# Patient Record
Sex: FEMALE | Race: OTHER | HISPANIC OR LATINO | Employment: UNEMPLOYED | URBAN - METROPOLITAN AREA
[De-identification: names, ages, dates, MRNs, and addresses within clinical notes are randomized per-mention and may not be internally consistent; named-entity substitution may affect disease eponyms.]

---

## 2022-09-08 ENCOUNTER — HOSPITAL ENCOUNTER (EMERGENCY)
Facility: HOSPITAL | Age: 32
Discharge: HOME | End: 2022-09-08
Attending: EMERGENCY MEDICINE
Payer: COMMERCIAL

## 2022-09-08 ENCOUNTER — APPOINTMENT (EMERGENCY)
Dept: CT IMAGING | Facility: HOSPITAL | Age: 32
End: 2022-09-08
Payer: COMMERCIAL

## 2022-09-08 ENCOUNTER — APPOINTMENT (OUTPATIENT)
Dept: ULTRASOUND IMAGING | Facility: HOSPITAL | Age: 32
End: 2022-09-08
Payer: COMMERCIAL

## 2022-09-08 VITALS
SYSTOLIC BLOOD PRESSURE: 143 MMHG | WEIGHT: 162.7 LBS | TEMPERATURE: 97.9 F | HEART RATE: 85 BPM | OXYGEN SATURATION: 100 % | RESPIRATION RATE: 16 BRPM | DIASTOLIC BLOOD PRESSURE: 97 MMHG

## 2022-09-08 DIAGNOSIS — R11.2 NAUSEA AND VOMITING: ICD-10-CM

## 2022-09-08 DIAGNOSIS — R10.11 RIGHT UPPER QUADRANT ABDOMINAL PAIN: Primary | ICD-10-CM

## 2022-09-08 DIAGNOSIS — R79.89 ELEVATED LFTS: ICD-10-CM

## 2022-09-08 LAB
ALBUMIN SERPL BCP-MCNC: 3.9 G/DL (ref 3.5–5)
ALP SERPL-CCNC: 104 U/L (ref 46–116)
ALT SERPL W P-5'-P-CCNC: 101 U/L (ref 12–78)
ANION GAP SERPL CALCULATED.3IONS-SCNC: 6 MMOL/L (ref 4–13)
AST SERPL W P-5'-P-CCNC: 250 U/L (ref 5–45)
BASOPHILS # BLD AUTO: 0.02 THOUSANDS/ΜL (ref 0–0.1)
BASOPHILS NFR BLD AUTO: 0 % (ref 0–1)
BILIRUB SERPL-MCNC: 0.58 MG/DL (ref 0.2–1)
BILIRUB UR QL STRIP: NEGATIVE
BUN SERPL-MCNC: 14 MG/DL (ref 5–25)
CALCIUM SERPL-MCNC: 9 MG/DL (ref 8.3–10.1)
CHLORIDE SERPL-SCNC: 103 MMOL/L (ref 96–108)
CLARITY UR: CLEAR
CO2 SERPL-SCNC: 31 MMOL/L (ref 21–32)
COLOR UR: YELLOW
CREAT SERPL-MCNC: 0.81 MG/DL (ref 0.6–1.3)
EOSINOPHIL # BLD AUTO: 0.02 THOUSAND/ΜL (ref 0–0.61)
EOSINOPHIL NFR BLD AUTO: 0 % (ref 0–6)
ERYTHROCYTE [DISTWIDTH] IN BLOOD BY AUTOMATED COUNT: 11.6 % (ref 11.6–15.1)
EXT PREG TEST URINE: NEGATIVE
EXT. CONTROL ED NAV: NORMAL
GFR SERPL CREATININE-BSD FRML MDRD: 97 ML/MIN/1.73SQ M
GLUCOSE SERPL-MCNC: 129 MG/DL (ref 65–140)
GLUCOSE UR STRIP-MCNC: NEGATIVE MG/DL
HCT VFR BLD AUTO: 41.9 % (ref 34.8–46.1)
HGB BLD-MCNC: 14.1 G/DL (ref 11.5–15.4)
HGB UR QL STRIP.AUTO: NEGATIVE
IMM GRANULOCYTES # BLD AUTO: 0.02 THOUSAND/UL (ref 0–0.2)
IMM GRANULOCYTES NFR BLD AUTO: 0 % (ref 0–2)
KETONES UR STRIP-MCNC: NEGATIVE MG/DL
LEUKOCYTE ESTERASE UR QL STRIP: NEGATIVE
LIPASE SERPL-CCNC: 122 U/L (ref 73–393)
LYMPHOCYTES # BLD AUTO: 0.92 THOUSANDS/ΜL (ref 0.6–4.47)
LYMPHOCYTES NFR BLD AUTO: 15 % (ref 14–44)
MCH RBC QN AUTO: 29.7 PG (ref 26.8–34.3)
MCHC RBC AUTO-ENTMCNC: 33.7 G/DL (ref 31.4–37.4)
MCV RBC AUTO: 88 FL (ref 82–98)
MONOCYTES # BLD AUTO: 0.23 THOUSAND/ΜL (ref 0.17–1.22)
MONOCYTES NFR BLD AUTO: 4 % (ref 4–12)
NEUTROPHILS # BLD AUTO: 4.92 THOUSANDS/ΜL (ref 1.85–7.62)
NEUTS SEG NFR BLD AUTO: 81 % (ref 43–75)
NITRITE UR QL STRIP: NEGATIVE
NRBC BLD AUTO-RTO: 0 /100 WBCS
PH UR STRIP.AUTO: 7 [PH] (ref 4.5–8)
PLATELET # BLD AUTO: 265 THOUSANDS/UL (ref 149–390)
PMV BLD AUTO: 9.7 FL (ref 8.9–12.7)
POTASSIUM SERPL-SCNC: 4 MMOL/L (ref 3.5–5.3)
PROT SERPL-MCNC: 8.6 G/DL (ref 6.4–8.4)
PROT UR STRIP-MCNC: NEGATIVE MG/DL
RBC # BLD AUTO: 4.75 MILLION/UL (ref 3.81–5.12)
SODIUM SERPL-SCNC: 140 MMOL/L (ref 135–147)
SP GR UR STRIP.AUTO: 1.02 (ref 1–1.03)
UROBILINOGEN UR QL STRIP.AUTO: 1 E.U./DL
WBC # BLD AUTO: 6.13 THOUSAND/UL (ref 4.31–10.16)

## 2022-09-08 PROCEDURE — 99243 OFF/OP CNSLTJ NEW/EST LOW 30: CPT | Performed by: PHYSICIAN ASSISTANT

## 2022-09-08 PROCEDURE — 76705 ECHO EXAM OF ABDOMEN: CPT

## 2022-09-08 PROCEDURE — 83690 ASSAY OF LIPASE: CPT | Performed by: PHYSICIAN ASSISTANT

## 2022-09-08 PROCEDURE — 96375 TX/PRO/DX INJ NEW DRUG ADDON: CPT

## 2022-09-08 PROCEDURE — G1004 CDSM NDSC: HCPCS

## 2022-09-08 PROCEDURE — 74177 CT ABD & PELVIS W/CONTRAST: CPT

## 2022-09-08 PROCEDURE — 85025 COMPLETE CBC W/AUTO DIFF WBC: CPT | Performed by: PHYSICIAN ASSISTANT

## 2022-09-08 PROCEDURE — NC001 PR NO CHARGE: Performed by: PHYSICIAN ASSISTANT

## 2022-09-08 PROCEDURE — 81025 URINE PREGNANCY TEST: CPT | Performed by: PHYSICIAN ASSISTANT

## 2022-09-08 PROCEDURE — 99284 EMERGENCY DEPT VISIT MOD MDM: CPT | Performed by: PHYSICIAN ASSISTANT

## 2022-09-08 PROCEDURE — 99284 EMERGENCY DEPT VISIT MOD MDM: CPT

## 2022-09-08 PROCEDURE — 80053 COMPREHEN METABOLIC PANEL: CPT | Performed by: PHYSICIAN ASSISTANT

## 2022-09-08 PROCEDURE — 36415 COLL VENOUS BLD VENIPUNCTURE: CPT | Performed by: PHYSICIAN ASSISTANT

## 2022-09-08 PROCEDURE — 96374 THER/PROPH/DIAG INJ IV PUSH: CPT

## 2022-09-08 PROCEDURE — 81003 URINALYSIS AUTO W/O SCOPE: CPT

## 2022-09-08 RX ORDER — ONDANSETRON 2 MG/ML
4 INJECTION INTRAMUSCULAR; INTRAVENOUS ONCE
Status: COMPLETED | OUTPATIENT
Start: 2022-09-08 | End: 2022-09-08

## 2022-09-08 RX ORDER — ONDANSETRON 4 MG/1
4 TABLET, ORALLY DISINTEGRATING ORAL EVERY 6 HOURS PRN
Qty: 20 TABLET | Refills: 0 | Status: SHIPPED | OUTPATIENT
Start: 2022-09-08

## 2022-09-08 RX ORDER — KETOROLAC TROMETHAMINE 30 MG/ML
15 INJECTION, SOLUTION INTRAMUSCULAR; INTRAVENOUS ONCE
Status: COMPLETED | OUTPATIENT
Start: 2022-09-08 | End: 2022-09-08

## 2022-09-08 RX ADMIN — IOHEXOL 100 ML: 350 INJECTION, SOLUTION INTRAVENOUS at 03:08

## 2022-09-08 RX ADMIN — KETOROLAC TROMETHAMINE 15 MG: 30 INJECTION, SOLUTION INTRAMUSCULAR at 02:31

## 2022-09-08 RX ADMIN — ONDANSETRON 4 MG: 2 INJECTION INTRAMUSCULAR; INTRAVENOUS at 02:20

## 2022-09-08 NOTE — CONSULTS
Consultation - General Surgery  Aguilar Mercado 28 y o  female MRN: [id number]  Unit/Bed#: ED 09 Encounter: [id number]    Reason for Consult: abnormal RUQ ultrasound      Assessment/Plan:  81-year-old female with no significant medical comorbidities presents with RUQ pain and symptomatic cholelithiasis    Afebrile, VSS  No leukocytosis with mild left shift  Total bilirubin WNL, mild LFT elevation    CT findings equivocal for acute cholecystitis  RUQ ultrasound revealed gallbladder normal in caliber, calculi without sludge, a focal gallbladder wall thickening and without any biliary dilatation or choledocholithiasis  No pericholecystic fluid indicate acute cholecystitis, with focal thickening may represent adenomyomatosis of the gallbladder  On exam, abdomen is nondistended, normoactive bowel sounds x4, soft and nontender on palpation, no guarding rigidity or peritoneal signs  Plan for PO challenge and discharge to home with ODT zofran with 1 week follow up with Dr Hilda Jerome  If fails PO challenge, will admit to surgery for lap librado    HPI:    Aguilar Mercado is a 28 y o  female with no significant medical comorbidities nor surgical history who presents with RUQ pain and symptomatic cholelithiasis  Pain associated with multiple episodes of nonbloody nonbilious emesis  It started yesterday evening after dinner, pains localized to RUQ and epigastrium with some radiation to the back  One episode of loose nonbloody stools  Denies fever, chills, chest pain, shortness of breath  Had a prior episode of the same about 1 year ago  Patient reported that her mother had gallbladder issues which necessitated lap librado  No other associated symptoms      Review of Systems:  History obtained from the patient  General ROS: negative for - chills or fever  ENT ROS: negative for - hearing change or visual changes  Hematological and Lymphatic ROS: negative for - bleeding problems or bruising  Respiratory ROS: negative for - cough, shortness of breath or wheezing  Cardiovascular ROS: negative for - chest pain or palpitations  Gastrointestinal ROS:  As above  Genito-Urinary ROS: negative for - dysuria or hematuria  Musculoskeletal ROS: negative for - muscle pain or muscular weakness  Dermatological ROS: negative for rash and skin lesion changes   All other ROS negative    Historical Information   History reviewed  No pertinent past medical history  History reviewed  No pertinent surgical history  Social History   Social History     Substance and Sexual Activity   Alcohol Use Yes    Comment: social     Social History     Substance and Sexual Activity   Drug Use Not Currently     Social History     Tobacco Use   Smoking Status Never Smoker   Smokeless Tobacco Never Used     History reviewed  No pertinent family history  Medications:  No current facility-administered medications for this encounter         No Known Allergies    Physical Examination:  Vitals:   Vitals:    09/08/22 0129 09/08/22 0333 09/08/22 0618 09/08/22 0906   BP: (!) 179/98 141/76 120/69 143/97   BP Location: Right arm Right arm Right arm    Pulse: 99 100 (!) 109 85   Resp: 20 19 17 16   Temp: 97 9 °F (36 6 °C)      SpO2: 99% 100% 99% 100%   Weight: 73 8 kg (162 lb 11 2 oz)        Temp  Min: 97 9 °F (36 6 °C)  Max: 97 9 °F (36 6 °C)         /97   Pulse 85   Temp 97 9 °F (36 6 °C)   Resp 16   Wt 73 8 kg (162 lb 11 2 oz)   SpO2 100%   General appearance: alert and oriented, in no acute distress  Head: Normocephalic, without obvious abnormality, atraumatic  Eyes: Sclerae anicteric  Neck: supple, symmetrical, trachea midline  Lungs: clear to auscultation bilaterally  Heart: regular rate and rhythm, S1, S2 normal, no murmur, click, rub or gallop  Abdomen: As above  Extremities: extremities normal, warm and well-perfused; no cyanosis, clubbing, or edema  Skin: Skin color, texture, turgor normal  No rashes or lesions  Neurologic: Grossly normal    Diagnostic Data:  Lab:   I have personally reviewed pertinent lab results  , CBC:   Lab Results   Component Value Date    WBC 6 13 09/08/2022    HGB 14 1 09/08/2022    HCT 41 9 09/08/2022    MCV 88 09/08/2022     09/08/2022    MCH 29 7 09/08/2022    MCHC 33 7 09/08/2022    RDW 11 6 09/08/2022    MPV 9 7 09/08/2022    NRBC 0 09/08/2022   , CMP:   Lab Results   Component Value Date    SODIUM 140 09/08/2022    K 4 0 09/08/2022     09/08/2022    CO2 31 09/08/2022    BUN 14 09/08/2022    CREATININE 0 81 09/08/2022    CALCIUM 9 0 09/08/2022     (H) 09/08/2022     (H) 09/08/2022    ALKPHOS 104 09/08/2022    EGFR 97 09/08/2022     Results from last 7 days   Lab Units 09/08/22  0219   WBC Thousand/uL 6 13   HEMOGLOBIN g/dL 14 1   HEMATOCRIT % 41 9   PLATELETS Thousands/uL 265     Results from last 7 days   Lab Units 09/08/22  0219   POTASSIUM mmol/L 4 0   CHLORIDE mmol/L 103   CO2 mmol/L 31   BUN mg/dL 14   CREATININE mg/dL 0 81   CALCIUM mg/dL 9 0   ALK PHOS U/L 104   ALT U/L 101*   AST U/L 250*       Imaging: I have personally reviewed the pertinent imaging studies on the PACS system  Procedure: US right upper quadrant    Result Date: 9/8/2022  Narrative: RIGHT UPPER QUADRANT ULTRASOUND INDICATION:     ? Faby on CT with CBD involvement  COMPARISON:  CT scan from 9/8/2022 TECHNIQUE:   Real-time ultrasound of the right upper quadrant was performed with a curvilinear transducer with both volumetric sweeps and still imaging techniques  FINDINGS: PANCREAS:  Visualized portions of the pancreas are within normal limits  AORTA AND IVC:  Visualized portions are normal for patient age  LIVER: Size:  Within normal range  The liver measures 16 6 cm in the midclavicular line  Contour:  Surface contour is smooth  Parenchyma:  Echogenicity and echotexture are within normal limits  No liver mass identified   Limited imaging of the main portal vein shows it to be patent and hepatopetal   BILIARY: The gallbladder is normal in caliber  There is focal gallbladder wall thickening  There are multiple dependent calculi without sludge  No sonographic Saez sign  No intrahepatic biliary dilatation  CBD measures 4 0 mm  No choledocholithiasis  KIDNEY: Right kidney measures 10 0 x 4 8 x 5 3 cm  Volume 134 5 mL Kidney within normal limits  ASCITES:   None  Impression: Cholelithiasis with focal gallbladder wall thickening and negative sonographic Saez's sign  The focal gallbladder wall thickening is nonspecific but may represent adenomyomatosis of the gallbladder  Neoplastic entities not excluded  The study was marked in [facility] for immediate notification  Workstation performed: [id number]     Procedure: CT abdomen pelvis with contrast    Result Date: 9/8/2022  Narrative: CT ABDOMEN AND PELVIS WITH IV CONTRAST INDICATION:   RUQ abdominal pain Abdominal pain, acute, nonlocalized Right upper quadrant abdominal pain    COMPARISON:  None  TECHNIQUE:  CT examination of the abdomen and pelvis was performed  Axial, sagittal, and coronal 2D reformatted images were created from the source data and submitted for interpretation  Radiation dose length product (DLP) for this visit:  411 mGy-cm   This examination, like all CT scans performed in the [facility], was performed utilizing techniques to minimize radiation dose exposure, including the use of iterative reconstruction and automated exposure control  IV Contrast:  100 mL of iohexol (OMNIPAQUE) Enteric Contrast:  Enteric contrast was not administered  FINDINGS: ABDOMEN LOWER CHEST:  No clinically significant abnormality identified in the visualized lower chest  LIVER/BILIARY TREE:  Mild prominence of the biliary tree, otherwise grossly unremarkable GALLBLADDER:  Mildly distended gallbladder  Gallbladder wall thickening, consider gallbladder inflammation/cholecystitis in the appropriate clinical setting  SPLEEN:  Unremarkable  PANCREAS:  Unremarkable  ADRENAL GLANDS:  Unremarkable  KIDNEYS/URETERS:  Unremarkable  No hydronephrosis  STOMACH AND BOWEL:  Unremarkable  APPENDIX:  No findings to suggest appendicitis  ABDOMINOPELVIC CAVITY:  No ascites  No pneumoperitoneum  No lymphadenopathy  VESSELS:  Unremarkable for patient's age  PELVIS REPRODUCTIVE ORGANS:  IUD in place, appears appropriately positioned  URINARY BLADDER:  Partially distended bladder  Mild circumferential bladder wall thickening noted, probably exaggerated by underdistention  Consider a cystitis in the appropriate clinical setting  ABDOMINAL WALL/INGUINAL REGIONS:  Unremarkable  OSSEOUS STRUCTURES:  No acute fracture or destructive osseous lesion  Impression: Mildly distended gallbladder with gallbladder wall thickening, consider gallbladder inflammation/cholecystitis in the appropriate clinical setting  Mild prominence of the biliary tree, nonspecific  A component of biliary obstruction may be present  Right upper quadrant ultrasound may be of benefit for further evaluation  Partially distended bladder  Mild circumferential bladder wall thickening noted, probably exaggerated by underdistention  Consider a cystitis in the appropriate clinical setting  IUD in place, appears appropriately positioned  Other findings as above  Workstation performed: [id number]       Active medications: The patients active medications were reviewed and modified as appropriate        Code Status: No Order      Counseling / Coordination of Care  Total floor / unit time spent today 30 minutes  Greater than 50% of total time was spent with the patient and / or family counseling and / or coordination of care         Jaimie Montemayor PA-C  9/8/2022

## 2022-09-08 NOTE — ED PROVIDER NOTES
Emergency Department Sign Out Note        Sign out and transfer of care from [facility]  See Separate Emergency Department note  The patient, Fallon Busch, was evaluated by the previous provider for abdominal pain      Workup Completed:  Results Reviewed     Procedure Component Value Units Date/Time    Comprehensive metabolic panel [[id number]]  (Abnormal) Collected: 09/08/22 0219    Lab Status: Final result Specimen: Blood from Arm, Left Updated: 09/08/22 0244     Sodium 140 mmol/L      Potassium 4 0 mmol/L      Chloride 103 mmol/L      CO2 31 mmol/L      ANION GAP 6 mmol/L      BUN 14 mg/dL      Creatinine 0 81 mg/dL      Glucose 129 mg/dL      Calcium 9 0 mg/dL       U/L       U/L      Alkaline Phosphatase 104 U/L      Total Protein 8 6 g/dL      Albumin 3 9 g/dL      Total Bilirubin 0 58 mg/dL      eGFR 97 ml/min/1 73sq m     Narrative:      National Kidney Disease Foundation guidelines for Chronic Kidney Disease (CKD):     Stage 1 with normal or high GFR (GFR > 90 mL/min/1 73 square meters)    Stage 2 Mild CKD (GFR = 60-89 mL/min/1 73 square meters)    Stage 3A Moderate CKD (GFR = 45-59 mL/min/1 73 square meters)    Stage 3B Moderate CKD (GFR = 30-44 mL/min/1 73 square meters)    Stage 4 Severe CKD (GFR = 15-29 mL/min/1 73 square meters)    Stage 5 End Stage CKD (GFR <15 mL/min/1 73 square meters)  Note: GFR calculation is accurate only with a steady state creatinine    Lipase [[id number]]  (Normal) Collected: 09/08/22 0219    Lab Status: Final result Specimen: Blood from Arm, Left Updated: 09/08/22 0244     Lipase 122 u/L     POCT pregnancy, urine [[id number]]  (Normal) Resulted: 09/08/22 0229    Lab Status: Final result Updated: 09/08/22 0229     EXT PREG TEST UR (Ref: Negative) NEGATIVE     Control VALID    Urine Macroscopic, POC [[id number]] Collected: 09/08/22 0227    Lab Status: Final result Specimen: Urine Updated: 09/08/22 0229     Color, UA Yellow     Clarity, UA Clear pH, UA 7 0     Leukocytes, UA Negative     Nitrite, UA Negative     Protein, UA Negative mg/dl      Glucose, UA Negative mg/dl      Ketones, UA Negative mg/dl      Urobilinogen, UA 1 0 E U /dl      Bilirubin, UA Negative     Occult Blood, UA Negative     Specific Gravity, UA 1 020    Narrative:      CLINITEK RESULT    CBC and differential [[id number]]  (Abnormal) Collected: 09/08/22 0219    Lab Status: Final result Specimen: Blood from Arm, Left Updated: 09/08/22 0227     WBC 6 13 Thousand/uL      RBC 4 75 Million/uL      Hemoglobin 14 1 g/dL      Hematocrit 41 9 %      MCV 88 fL      MCH 29 7 pg      MCHC 33 7 g/dL      RDW 11 6 %      MPV 9 7 fL      Platelets 772 Thousands/uL      nRBC 0 /100 WBCs      Neutrophils Relative 81 %      Immat GRANS % 0 %      Lymphocytes Relative 15 %      Monocytes Relative 4 %      Eosinophils Relative 0 %      Basophils Relative 0 %      Neutrophils Absolute 4 92 Thousands/µL      Immature Grans Absolute 0 02 Thousand/uL      Lymphocytes Absolute 0 92 Thousands/µL      Monocytes Absolute 0 23 Thousand/µL      Eosinophils Absolute 0 02 Thousand/µL      Basophils Absolute 0 02 Thousands/µL         US right upper quadrant   Final Result      Cholelithiasis with focal gallbladder wall thickening and negative sonographic Saez's sign  The focal gallbladder wall thickening is nonspecific but may represent adenomyomatosis of the gallbladder  Neoplastic entities not excluded  The study was marked in [facility] for immediate notification  Workstation performed: [id number]         CT abdomen pelvis with contrast   Final Result      Mildly distended gallbladder with gallbladder wall thickening, consider gallbladder inflammation/cholecystitis in the appropriate clinical setting  Mild prominence of the biliary tree, nonspecific  A component of biliary obstruction may be present  Right upper quadrant ultrasound may be of benefit for further evaluation        Partially distended bladder  Mild circumferential bladder wall thickening noted, probably exaggerated by underdistention  Consider a cystitis in the appropriate clinical setting  IUD in place, appears appropriately positioned  Other findings as above  Workstation performed: [id number]               ED Course / Workup Pending (followup):  Pending U/S RUQ                                  ED Course as of 09/08/22 1129   Thu Sep 08, 2022   0645 Sign out from , surgery aware, pending U/S RUQ    0915 TT to general surgery   0930 Surgery will be down to see patient   1051 Surgery recommends PO challenge and discharge with outpatient f/u     Procedures  MDM  Number of Diagnoses or Management Options  Elevated LFTs  Nausea and vomiting  Right upper quadrant abdominal pain  Diagnosis management comments: 28 y o   female with no documented past medical history who presents to the emergency department with epigastric/right upper quadrant abdominal pain  Elevated LFTs  No leukocytosis  CT a/p results above   U/S RUQ results above  Discussed with surgery who saw and evaluated patient, patient tolerated PO in ED, surgery recommends discharge with outpatient f/u next week    Patient verbalizes understanding and agrees with plan  The management plan was discussed in detail with the patient at bedside and all questions were answered  Prior to discharge, I provided both verbal and written instructions  I discussed with the patient the signs and symptoms for which to return to the emergency department  All questions were answered and patient was comfortable with the plan of care and discharged to home  The patient agrees to return to the Emergency Department for concerns and/or progression of illness              Disposition  Final diagnoses:   Right upper quadrant abdominal pain   Nausea and vomiting   Elevated LFTs     Time reflects when diagnosis was documented in both MDM as applicable and the Disposition within this note     Time User Action Codes Description Comment    9/8/2022  7:17 AM César Hockey Add [R10 11] Right upper quadrant abdominal pain     9/8/2022  7:17 AM César Hockey Add [R11 2] Nausea and vomiting     9/8/2022 11:20 AM Mallory Cinnamon Add [R79 89] Elevated LFTs       ED Disposition     ED Disposition   Discharge    Condition   Stable    Date/Time   Thu Sep 8, 2022 11:19 AM    Comment   Krista Licona discharge to home/self care  Follow-up Information     Follow up With Specialties Details Why Contact Info    Serenity Lyn MD General Surgery Schedule an appointment as soon as possible for a visit in 1 week(s)  [facility] [address] [facility] [address]          Patient's Medications   Discharge Prescriptions    ONDANSETRON (ZOFRAN-ODT) 4 MG DISINTEGRATING TABLET    Take 1 tablet (4 mg total) by mouth every 6 (six) hours as needed for nausea or vomiting       Start Date: 9/8/2022  End Date: --       Order Dose: 4 mg       Quantity: 20 tablet    Refills: 0     No discharge procedures on file         ED Provider  Electronically Signed by     Shad Singer PA-C  09/08/22 1129

## 2022-09-08 NOTE — ED PROVIDER NOTES
History  Chief Complaint   Patient presents with    Abdominal Pain     Mid abdominal pain/bloating starting at 2000 tonight  C/o nausea/vomiting     Ruby Patel is a 28 y o   female with no documented past medical history who presents to the emergency department with epigastric/right upper quadrant abdominal pain  Patient states that shortly after eating at 8:00 p m  She started with right upper quadrant pain radiating to her epigastrium  She has associated nausea as well as a few episodes of nonbloody nonbilious vomit  She denies any diarrhea, fevers, chills, previous abdominal surgeries, urinary complaints, or vaginal bleeding/vaginal discharge  She denies any chest pain, shortness of breath, palpitations, dizziness, lightheadedness  She has not had anything to eat since episodes of vomiting at 8:00 p m  Her symptoms are constant and not worsening or improving              History provided by:  Patient   used: No    Abdominal Pain  Pain location:  RUQ  Pain quality: not aching    Pain radiates to:  Epigastric region  Pain severity:  Moderate  Onset quality:  Gradual  Duration:  6 hours  Timing:  Constant  Progression:  Unchanged  Chronicity:  New  Context: eating    Context: not alcohol use, not awakening from sleep, not diet changes, not laxative use, not medication withdrawal, not previous surgeries, not recent illness, not recent sexual activity, not recent travel, not retching, not sick contacts, not suspicious food intake and not trauma    Relieved by:  None tried  Worsened by:  Nothing  Ineffective treatments:  None tried  Associated symptoms: nausea and vomiting    Associated symptoms: no anorexia, no belching, no chest pain, no chills, no constipation, no cough, no diarrhea, no dysuria, no fatigue, no fever, no flatus, no hematemesis, no hematochezia, no hematuria, no melena, no shortness of breath, no sore throat, no vaginal bleeding and no vaginal discharge    Nausea: Severity:  Moderate    Onset quality:  Gradual    Duration:  6 hours    Timing:  Constant    Progression:  Unchanged  Vomiting:     Quality:  Stomach contents    Severity:  Moderate    Duration:  6 hours    Timing:  Constant    Progression:  Unchanged      None       History reviewed  No pertinent past medical history  History reviewed  No pertinent surgical history  History reviewed  No pertinent family history  I have reviewed and agree with the history as documented  E-Cigarette/Vaping     E-Cigarette/Vaping Substances     Social History     Tobacco Use    Smoking status: Never Smoker    Smokeless tobacco: Never Used   Substance Use Topics    Alcohol use: Yes     Comment: social    Drug use: Not Currently       Review of Systems   Constitutional: Negative for activity change, appetite change, chills, diaphoresis, fatigue, fever and unexpected weight change  HENT: Negative for congestion, dental problem, ear pain, mouth sores, nosebleeds, sinus pressure, sinus pain, sneezing, sore throat and trouble swallowing  Respiratory: Negative for apnea, cough, choking, chest tightness, shortness of breath, wheezing and stridor  Cardiovascular: Negative for chest pain, palpitations and leg swelling  Gastrointestinal: Positive for abdominal pain, nausea and vomiting  Negative for anorexia, constipation, diarrhea, flatus, hematemesis, hematochezia and melena  Genitourinary: Negative for dysuria, flank pain, frequency, hematuria, urgency, vaginal bleeding and vaginal discharge  Musculoskeletal: Negative for arthralgias, joint swelling and myalgias  Skin: Negative for color change, pallor and rash  Neurological: Negative for dizziness, tremors, seizures, syncope, speech difficulty, weakness, light-headedness, numbness and headaches  All other systems reviewed and are negative  Physical Exam  Physical Exam  Vitals and nursing note reviewed     Constitutional:       General: She is not in acute distress  Appearance: Normal appearance  She is normal weight  She is not ill-appearing or toxic-appearing  HENT:      Head: Normocephalic and atraumatic  Mouth/Throat:      Mouth: Mucous membranes are moist       Pharynx: Oropharynx is clear  Eyes:      Extraocular Movements: Extraocular movements intact  Pupils: Pupils are equal, round, and reactive to light  Cardiovascular:      Rate and Rhythm: Normal rate and regular rhythm  Pulses: Normal pulses  Heart sounds: Normal heart sounds  No murmur heard  No friction rub  No gallop  Pulmonary:      Effort: Pulmonary effort is normal  No respiratory distress  Breath sounds: Normal breath sounds  No stridor  No wheezing, rhonchi or rales  Abdominal:      General: Abdomen is flat  Bowel sounds are normal  There is no distension  Palpations: Abdomen is soft  There is no mass  Tenderness: There is abdominal tenderness in the right upper quadrant and epigastric area  There is no right CVA tenderness, left CVA tenderness, guarding or rebound  Positive signs include Saez's sign  Negative signs include Rovsing's sign, McBurney's sign and obturator sign  Hernia: No hernia is present  Musculoskeletal:         General: No swelling, tenderness, deformity or signs of injury  Normal range of motion  Cervical back: Normal range of motion  No rigidity or tenderness  Right lower leg: No edema  Left lower leg: No edema  Skin:     General: Skin is warm and dry  Capillary Refill: Capillary refill takes less than 2 seconds  Neurological:      General: No focal deficit present  Mental Status: She is alert and oriented to person, place, and time  Mental status is at baseline  Cranial Nerves: No cranial nerve deficit  Sensory: No sensory deficit  Motor: No weakness        Coordination: Coordination normal          Vital Signs  ED Triage Vitals [09/08/22 0129]   Temperature Pulse Respirations Blood Pressure SpO2   97 9 °F (36 6 °C) 99 20 (!) 179/98 99 %      Temp src Heart Rate Source Patient Position - Orthostatic VS BP Location FiO2 (%)   -- Monitor Sitting Right arm --      Pain Score       10 - Worst Possible Pain           Vitals:    09/08/22 0129 09/08/22 0333 09/08/22 0618   BP: (!) 179/98 141/76 120/69   Pulse: 99 100 (!) 109   Patient Position - Orthostatic VS: Sitting Lying Lying         Visual Acuity      ED Medications  Medications   ketorolac (TORADOL) injection 15 mg (15 mg Intravenous Given 9/8/22 0231)   ondansetron (ZOFRAN) injection 4 mg (4 mg Intravenous Given 9/8/22 0220)   iohexol (OMNIPAQUE) 350 MG/ML injection (SINGLE-DOSE) 100 mL (100 mL Intravenous Given 9/8/22 0308)       Diagnostic Studies  Results Reviewed     Procedure Component Value Units Date/Time    Comprehensive metabolic panel [[id number]]  (Abnormal) Collected: 09/08/22 0219    Lab Status: Final result Specimen: Blood from Arm, Left Updated: 09/08/22 0244     Sodium 140 mmol/L      Potassium 4 0 mmol/L      Chloride 103 mmol/L      CO2 31 mmol/L      ANION GAP 6 mmol/L      BUN 14 mg/dL      Creatinine 0 81 mg/dL      Glucose 129 mg/dL      Calcium 9 0 mg/dL       U/L       U/L      Alkaline Phosphatase 104 U/L      Total Protein 8 6 g/dL      Albumin 3 9 g/dL      Total Bilirubin 0 58 mg/dL      eGFR 97 ml/min/1 73sq m     Narrative:      Sumeet guidelines for Chronic Kidney Disease (CKD):     Stage 1 with normal or high GFR (GFR > 90 mL/min/1 73 square meters)    Stage 2 Mild CKD (GFR = 60-89 mL/min/1 73 square meters)    Stage 3A Moderate CKD (GFR = 45-59 mL/min/1 73 square meters)    Stage 3B Moderate CKD (GFR = 30-44 mL/min/1 73 square meters)    Stage 4 Severe CKD (GFR = 15-29 mL/min/1 73 square meters)    Stage 5 End Stage CKD (GFR <15 mL/min/1 73 square meters)  Note: GFR calculation is accurate only with a steady state creatinine    Lipase [[id number]] (Normal) Collected: 09/08/22 0219    Lab Status: Final result Specimen: Blood from Arm, Left Updated: 09/08/22 0244     Lipase 122 u/L     POCT pregnancy, urine [[id number]]  (Normal) Resulted: 09/08/22 0229    Lab Status: Final result Updated: 09/08/22 0229     EXT PREG TEST UR (Ref: Negative) NEGATIVE     Control VALID    Urine Macroscopic, POC [[id number]] Collected: 09/08/22 0227    Lab Status: Final result Specimen: Urine Updated: 09/08/22 0229     Color, UA Yellow     Clarity, UA Clear     pH, UA 7 0     Leukocytes, UA Negative     Nitrite, UA Negative     Protein, UA Negative mg/dl      Glucose, UA Negative mg/dl      Ketones, UA Negative mg/dl      Urobilinogen, UA 1 0 E U /dl      Bilirubin, UA Negative     Occult Blood, UA Negative     Specific Gravity, UA 1 020    Narrative:      CLINITEK RESULT    CBC and differential [[id number]]  (Abnormal) Collected: 09/08/22 0219    Lab Status: Final result Specimen: Blood from Arm, Left Updated: 09/08/22 0227     WBC 6 13 Thousand/uL      RBC 4 75 Million/uL      Hemoglobin 14 1 g/dL      Hematocrit 41 9 %      MCV 88 fL      MCH 29 7 pg      MCHC 33 7 g/dL      RDW 11 6 %      MPV 9 7 fL      Platelets 944 Thousands/uL      nRBC 0 /100 WBCs      Neutrophils Relative 81 %      Immat GRANS % 0 %      Lymphocytes Relative 15 %      Monocytes Relative 4 %      Eosinophils Relative 0 %      Basophils Relative 0 %      Neutrophils Absolute 4 92 Thousands/µL      Immature Grans Absolute 0 02 Thousand/uL      Lymphocytes Absolute 0 92 Thousands/µL      Monocytes Absolute 0 23 Thousand/µL      Eosinophils Absolute 0 02 Thousand/µL      Basophils Absolute 0 02 Thousands/µL                  CT abdomen pelvis with contrast   Final Result by Oscar Batres DO (09/08 1839)      Mildly distended gallbladder with gallbladder wall thickening, consider gallbladder inflammation/cholecystitis in the appropriate clinical setting        Mild prominence of the biliary tree, nonspecific  A component of biliary obstruction may be present  Right upper quadrant ultrasound may be of benefit for further evaluation  Partially distended bladder  Mild circumferential bladder wall thickening noted, probably exaggerated by underdistention  Consider a cystitis in the appropriate clinical setting  IUD in place, appears appropriately positioned  Other findings as above  Workstation performed: [id number]         US right upper quadrant    (Results Pending)              Procedures  Procedures         ED Course                               SBIRT 20yo+    Flowsheet Row Most Recent Value   SBIRT (23 yo +)    In order to provide better care to our patients, we are screening all of our patients for alcohol and drug use  Would it be okay to ask you these screening questions? No Filed at: 09/08/2022 0201                    MDM  Number of Diagnoses or Management Options  Nausea and vomiting: new and requires workup  Right upper quadrant abdominal pain: new and requires workup  Diagnosis management comments: Patient was seen and examined  in the emergency department for chief complaint of epigastric and right upper quadrant abdominal pain  The patient presented approximately 68 hours of symptoms  Has associated nausea and vomiting  No diarrhea fevers or chills  No urinary complaints or vaginal complaints  No chest pain shortness of breath palpitations  No previous abdominal surgeries  On exam patient is in no acute distress, lungs are clear  Regular rate rhythm, no murmurs rubs or gallops  Abdomen is soft, tenderness in the right upper quadrant positive Saez sign  No rebound or guarding  No rashes or evidence of trauma        DDx including but not limited to: appendicitis, gastroenteritis, gastritis, PUD, GERD, gastroparesis, hepatitis, pancreatitis, colitis, enteritis, food poisoning, mesenteric adenitis, epiploic appendagitis, IBD, IBS, ileus, bowel obstruction, volvulus, cholecystitis, biliary colic, choledocholithiasis, perforated viscus, tumor, splenic etiology, diverticulitis, internal hernia, constipation, pelvic pathology, renal colic, pyelonephritis, UTI  Workup: Will check labs, urinalysis, CT abdomen pelvis with  Symptomatic control with Zofran, Toradol, bolus  Reassessment  4:28 AM On reassessment patient symptomatically feeling better  Possible evidence of cholecystitis on CT scan  Will attempt to surgery  Likely need right upper quadrant ultrasound  - spoke with surgery  They recommend right upper quadrant re-consult at that point  Disposition:  Signed out to oncoming provider pending right upper quadrant/ consultaitons and final disposition  Disposition pending results  Amount and/or Complexity of Data Reviewed  Clinical lab tests: ordered and reviewed  Tests in the radiology section of CPT®: ordered and reviewed  Tests in the medicine section of CPT®: ordered and reviewed  Review and summarize past medical records: yes  Independent visualization of images, tracings, or specimens: yes    Risk of Complications, Morbidity, and/or Mortality  Presenting problems: moderate  Diagnostic procedures: moderate  Management options: moderate    Patient Progress  Patient progress: stable      Disposition  Final diagnoses:   Right upper quadrant abdominal pain   Nausea and vomiting     Time reflects when diagnosis was documented in both MDM as applicable and the Disposition within this note     Time User Action Codes Description Comment    9/8/2022  7:17 AM Diaz Davidson Add [R10 11] Right upper quadrant abdominal pain     9/8/2022  7:17 AM Quinn Fall Add [R11 2] Nausea and vomiting       ED Disposition     None      Follow-up Information    None         Patient's Medications    No medications on file       No discharge procedures on file      PDMP Review     None          ED Provider  Electronically Signed by           Gerson Lagunas Lynnann Castleman, PA-C  09/08/22 0694

## 2022-09-08 NOTE — DISCHARGE INSTRUCTIONS
PANCREAS:  Visualized portions of the pancreas are within normal limits  AORTA AND IVC:  Visualized portions are normal for patient age  LIVER:  Size:  Within normal range  The liver measures 16 6 cm in the midclavicular line  Contour:  Surface contour is smooth  Parenchyma:  Echogenicity and echotexture are within normal limits  No liver mass identified  Limited imaging of the main portal vein shows it to be patent and hepatopetal      BILIARY:  The gallbladder is normal in caliber  There is focal gallbladder wall thickening  There are multiple dependent calculi without sludge  No sonographic Saez sign  No intrahepatic biliary dilatation  CBD measures 4 0 mm  No choledocholithiasis  KIDNEY:   Right kidney measures 10 0 x 4 8 x 5 3 cm  Volume 134 5 mL  Kidney within normal limits  ASCITES:   None  IMPRESSION:     Cholelithiasis with focal gallbladder wall thickening and negative sonographic Saez's sign  The focal gallbladder wall thickening is nonspecific but may represent adenomyomatosis of the gallbladder  Neoplastic entities not excluded  LOWER CHEST:  No clinically significant abnormality identified in the visualized lower chest      LIVER/BILIARY TREE:  Mild prominence of the biliary tree, otherwise grossly unremarkable     GALLBLADDER:  Mildly distended gallbladder  Gallbladder wall thickening, consider gallbladder inflammation/cholecystitis in the appropriate clinical setting  SPLEEN:  Unremarkable  PANCREAS:  Unremarkable  ADRENAL GLANDS:  Unremarkable  KIDNEYS/URETERS:  Unremarkable  No hydronephrosis  STOMACH AND BOWEL:  Unremarkable  APPENDIX:  No findings to suggest appendicitis  ABDOMINOPELVIC CAVITY:  No ascites  No pneumoperitoneum  No lymphadenopathy  VESSELS:  Unremarkable for patient's age  PELVIS     REPRODUCTIVE ORGANS:  IUD in place, appears appropriately positioned  URINARY BLADDER:  Partially distended bladder  Mild circumferential bladder wall thickening noted, probably exaggerated by underdistention  Consider a cystitis in the appropriate clinical setting  ABDOMINAL WALL/INGUINAL REGIONS:  Unremarkable  OSSEOUS STRUCTURES:  No acute fracture or destructive osseous lesion  IMPRESSION:     Mildly distended gallbladder with gallbladder wall thickening, consider gallbladder inflammation/cholecystitis in the appropriate clinical setting  Mild prominence of the biliary tree, nonspecific  A component of biliary obstruction may be present  Right upper quadrant ultrasound may be of benefit for further evaluation  Partially distended bladder  Mild circumferential bladder wall thickening noted, probably exaggerated by underdistention  Consider a cystitis in the appropriate clinical setting  IUD in place, appears appropriately positioned

## 2022-09-08 NOTE — ED NOTES
Dr Jenny Mejía was at [facility], [address] [place] [address]  09/08/22 4040

## 2022-09-08 NOTE — Clinical Note
Krista Caicedo was seen and treated in our emergency department on 9/8/2022  Diagnosis:     Josselin Cordoba  may return to work on return date  She may return on this date: 09/12/2022         If you have any questions or concerns, please don't hesitate to call        Wendy Bingham PA-C    ______________________________           _______________          _______________  Hospital Representative                              Date                                Time

## 2025-06-30 ENCOUNTER — HOSPITAL ENCOUNTER (EMERGENCY)
Facility: HOSPITAL | Age: 35
Discharge: HOME | End: 2025-06-30
Attending: EMERGENCY MEDICINE | Admitting: EMERGENCY MEDICINE

## 2025-06-30 VITALS
TEMPERATURE: 98.2 F | SYSTOLIC BLOOD PRESSURE: 125 MMHG | RESPIRATION RATE: 20 BRPM | WEIGHT: 162.26 LBS | HEART RATE: 95 BPM | OXYGEN SATURATION: 96 % | DIASTOLIC BLOOD PRESSURE: 91 MMHG

## 2025-06-30 DIAGNOSIS — R68.89 FLU-LIKE SYMPTOMS: Primary | ICD-10-CM

## 2025-06-30 LAB
FLUAV RNA RESP QL NAA+PROBE: NEGATIVE
FLUBV RNA RESP QL NAA+PROBE: NEGATIVE
RSV RNA RESP QL NAA+PROBE: NEGATIVE
SARS-COV-2 RNA RESP QL NAA+PROBE: POSITIVE

## 2025-06-30 PROCEDURE — 99283 EMERGENCY DEPT VISIT LOW MDM: CPT

## 2025-06-30 PROCEDURE — 0241U HB NFCT DS VIR RESP RNA 4 TRGT: CPT

## 2025-06-30 NOTE — Clinical Note
Shefali Huddleston was seen and treated in our emergency department on 6/30/2025.                Diagnosis: Viral upper respiratory illness    Shefali  may return to work on return date.    She may return on this date: 07/02/2025         If you have any questions or concerns, please don't hesitate to call.      Sundar Lovett PA-C    ______________________________           _______________          _______________  Hospital Representative                              Date                                Time

## 2025-07-01 NOTE — ED PROVIDER NOTES
Time reflects when diagnosis was documented in both MDM as applicable and the Disposition within this note       Time User Action Codes Description Comment    6/30/2025  9:16 PM Sundar Lovett Add [R68.89] Flu-like symptoms           ED Disposition       ED Disposition   Discharge    Condition   Stable    Date/Time   Mon Jun 30, 2025  9:16 PM    Comment   Shefalirahel Huddleston discharge to home/self care.                   Assessment & Plan       Medical Decision Making  34-year-old female presenting with flulike symptoms for 2 days.  Recent sick contacts.  On exam appears to be in NAD.  Moderate nasal congestion.  Lungs CTAB.  No posterior oropharyngeal erythema or exudates.    Patient was tested for COVID/flu/RSV.  She declines any symptomatic management at this time.  She will return home for supportive care, work note for tomorrow.  Agreeable to discharge pending swab results.  Patient expresses understanding of the condition, treatment plan, follow-up instructions, and return precautions.               Medications - No data to display    ED Risk Strat Scores                    No data recorded                            History of Present Illness       Chief Complaint   Patient presents with    Flu Symptoms     Pt c/o of body aches, chills , dry cough and fever for 2 days, took dayquil 1.5 hrs ago, coworkers tested positive for Covid recently       Past Medical History[1]   Past Surgical History[2]   Family History[3]   Social History[4]   E-Cigarette/Vaping      E-Cigarette/Vaping Substances      I have reviewed and agree with the history as documented.     34-year-old female presenting with flulike symptoms for 2 days.  Complains of cough, sore throat, myalgias, fever, chills, nasal congestion.  States multiple coworkers tested positive for COVID recently.  Taking DayQuil/NyQuil at home with some relief.        Review of Systems   Constitutional:  Positive for chills and fever.   HENT:  Positive for congestion and  sore throat. Negative for ear pain.    Eyes:  Negative for pain and visual disturbance.   Respiratory:  Positive for cough. Negative for shortness of breath.    Cardiovascular:  Negative for chest pain and palpitations.   Gastrointestinal:  Negative for abdominal pain and vomiting.   Genitourinary:  Negative for dysuria and hematuria.   Musculoskeletal:  Positive for myalgias. Negative for arthralgias and back pain.   Skin:  Negative for color change and rash.   Neurological:  Negative for seizures and syncope.   All other systems reviewed and are negative.          Objective       ED Triage Vitals [06/30/25 2048]   Temperature Pulse Blood Pressure Respirations SpO2 Patient Position - Orthostatic VS   98.2 °F (36.8 °C) 95 125/91 20 96 % Sitting      Temp Source Heart Rate Source BP Location FiO2 (%) Pain Score    Oral -- -- -- --      Vitals      Date and Time Temp Pulse SpO2 Resp BP Pain Score FACES Pain Rating User   06/30/25 2048 98.2 °F (36.8 °C) 95 96 % 20 125/91 -- -- AEN            Physical Exam  Vitals and nursing note reviewed.   Constitutional:       General: She is not in acute distress.     Appearance: She is well-developed.   HENT:      Head: Normocephalic and atraumatic.      Nose: Congestion present.     Eyes:      Conjunctiva/sclera: Conjunctivae normal.       Cardiovascular:      Rate and Rhythm: Normal rate and regular rhythm.      Heart sounds: No murmur heard.  Pulmonary:      Effort: Pulmonary effort is normal. No respiratory distress.      Breath sounds: Normal breath sounds.   Abdominal:      Palpations: Abdomen is soft.      Tenderness: There is no abdominal tenderness.     Musculoskeletal:         General: No swelling.      Cervical back: Neck supple.     Skin:     General: Skin is warm and dry.      Capillary Refill: Capillary refill takes less than 2 seconds.     Neurological:      Mental Status: She is alert.     Psychiatric:         Mood and Affect: Mood normal.         Results Reviewed        Procedure Component Value Units Date/Time    FLU/RSV/COVID - if FLU/RSV clinically relevant (2hr TAT) [[id number]]  (Abnormal) Collected: 06/30/25 2125    Lab Status: Final result Specimen: Nares from Nose Updated: 06/30/25 2207     SARS-CoV-2 Positive     INFLUENZA A PCR Negative     INFLUENZA B PCR Negative     RSV PCR Negative    Narrative:      This test has been performed using the CoV-2/Flu/RSV plus assay on the [employer] GeneXpert platform. This test has been validated by the  and verified by the performing laboratory.     This test is designed to amplify and detect the following: nucleocapsid (N), envelope (E), and RNA-dependent RNA polymerase (RdRP) genes of the SARS-CoV-2 genome; matrix (M), basic polymerase (PB2), and acidic protein (PA) segments of the influenza A genome; matrix (M) and non-structural protein (NS) segments of the influenza B genome, and the nucleocapsid genes of RSV A and RSV B.     Positive results are indicative of the presence of Flu A, Flu B, RSV, and/or SARS-CoV-2 RNA. Positive results for SARS-CoV-2 or suspected novel influenza should be reported to state, local, or federal health departments according to local reporting requirements.      All results should be assessed in conjunction with clinical presentation and other laboratory markers for clinical management.     FOR PEDIATRIC PATIENTS - copy/paste COVID Guidelines URL to browser: [email or web address]               No orders to display       Procedures    ED Medication and Procedure Management   Prior to Admission Medications   Prescriptions Last Dose Informant Patient Reported? Taking?   ondansetron (ZOFRAN-ODT) 4 mg disintegrating tablet   No No   Sig: Take 1 tablet (4 mg total) by mouth every 6 (six) hours as needed for nausea or vomiting      Facility-Administered Medications: None     Discharge Medication List as of 6/30/2025  9:16 PM        CONTINUE  these medications which have NOT CHANGED    Details   ondansetron (ZOFRAN-ODT) 4 mg disintegrating tablet Take 1 tablet (4 mg total) by mouth every 6 (six) hours as needed for nausea or vomiting, Starting Thu 9/8/2022, Normal           No discharge procedures on file.  ED SEPSIS DOCUMENTATION   Time reflects when diagnosis was documented in both MDM as applicable and the Disposition within this note       Time User Action Codes Description Comment    6/30/2025  9:16 PM Sundar Lovett Add [R68.89] Flu-like symptoms                      [1] No past medical history on file.  [2] No past surgical history on file.  [3] No family history on file.  [4]   Social History  Tobacco Use    Smoking status: Never    Smokeless tobacco: Never   Substance Use Topics    Alcohol use: Yes     Comment: social    Drug use: Not Currently        Sundar Lovett PA-C  07/01/25 0513

## 2025-07-01 NOTE — DISCHARGE INSTRUCTIONS
Please continue to rest, hydrate, use over-the-counter medications as needed for symptomatic relief.  You can view results on [employer] and we will call you if your COVID test is positive.